# Patient Record
Sex: MALE | Race: WHITE | URBAN - NONMETROPOLITAN AREA
[De-identification: names, ages, dates, MRNs, and addresses within clinical notes are randomized per-mention and may not be internally consistent; named-entity substitution may affect disease eponyms.]

---

## 2018-02-13 ENCOUNTER — OFFICE VISIT (OUTPATIENT)
Dept: FAMILY MEDICINE | Facility: OTHER | Age: 28
End: 2018-02-13
Attending: PHYSICIAN ASSISTANT
Payer: COMMERCIAL

## 2018-02-13 VITALS
WEIGHT: 293 LBS | HEIGHT: 71 IN | TEMPERATURE: 97.3 F | BODY MASS INDEX: 41.02 KG/M2 | DIASTOLIC BLOOD PRESSURE: 78 MMHG | OXYGEN SATURATION: 98 % | SYSTOLIC BLOOD PRESSURE: 122 MMHG | HEART RATE: 82 BPM

## 2018-02-13 DIAGNOSIS — L98.9 SKIN LESION: ICD-10-CM

## 2018-02-13 DIAGNOSIS — Z76.89 ENCOUNTER TO ESTABLISH CARE: Primary | ICD-10-CM

## 2018-02-13 DIAGNOSIS — R03.0 ELEVATED BLOOD-PRESSURE READING WITHOUT DIAGNOSIS OF HYPERTENSION: ICD-10-CM

## 2018-02-13 PROCEDURE — 99203 OFFICE O/P NEW LOW 30 MIN: CPT | Performed by: PHYSICIAN ASSISTANT

## 2018-02-13 PROCEDURE — G0463 HOSPITAL OUTPT CLINIC VISIT: HCPCS

## 2018-02-13 RX ORDER — PENICILLIN V POTASSIUM 250 MG/5ML
500 SOLUTION, RECONSTITUTED, ORAL ORAL 4 TIMES DAILY
COMMUNITY
End: 2018-11-13

## 2018-02-13 ASSESSMENT — ANXIETY QUESTIONNAIRES
1. FEELING NERVOUS, ANXIOUS, OR ON EDGE: NEARLY EVERY DAY
5. BEING SO RESTLESS THAT IT IS HARD TO SIT STILL: NOT AT ALL
4. TROUBLE RELAXING: MORE THAN HALF THE DAYS
GAD7 TOTAL SCORE: 15
2. NOT BEING ABLE TO STOP OR CONTROL WORRYING: NEARLY EVERY DAY
3. WORRYING TOO MUCH ABOUT DIFFERENT THINGS: MORE THAN HALF THE DAYS
6. BECOMING EASILY ANNOYED OR IRRITABLE: NEARLY EVERY DAY
IF YOU CHECKED OFF ANY PROBLEMS ON THIS QUESTIONNAIRE, HOW DIFFICULT HAVE THESE PROBLEMS MADE IT FOR YOU TO DO YOUR WORK, TAKE CARE OF THINGS AT HOME, OR GET ALONG WITH OTHER PEOPLE: SOMEWHAT DIFFICULT
7. FEELING AFRAID AS IF SOMETHING AWFUL MIGHT HAPPEN: MORE THAN HALF THE DAYS

## 2018-02-13 ASSESSMENT — PAIN SCALES - GENERAL: PAINLEVEL: NO PAIN (0)

## 2018-02-13 NOTE — PROGRESS NOTES
Subjective:  Marco Antonio Reyes is a 27 year old male who presents to establish cares. He describes himself as a high functioning autisitic individual. He has history of bipolar disorder and paranoia. He has been on several medications over his lifetime but discontinued all of these 8-9 years ago. He is using lifestyle techniques to control his symptoms.  Concern is a skin lesion on right leg that has been present for years. Itches and bleeds sometimes. He would like removed.  Next he went to the dentist to have a tooth extracted. They check his BP and told him it was too high and he would need to address that before tooth could be removed. No history of heart disease.      Active diagnoses this visit:  Skin lesion    PMH, PSH, FH reviewed and unchanged    Current Outpatient Prescriptions   Medication     penicillin V (VEETID) 250 mg/5 mL suspension     No current facility-administered medications for this visit.        No Known Allergies    Review of Systems:  Gen: recent illness, change in weight  Derm: as above  Resp: denies significant cough, SOB or wheeze  CV: denies chest pain, palpitations   Psych: as above    Objective:    B/P: 122/78, T: 97.3, P: 82, R: Data Unavailable  Body mass index is 40.87 kg/(m^2).    Physical Exam:  Constitutional: healthy, alert and no distress  CV: RRR. No murmur. No peripheral edema  Pulm: Lungs clear to auscultation without wheeze, rales or rhonchi.   Skin: 1.6 raised pink nodule right lower leg.  Psych: Alert, orientated      Assessment/Plan  (Z76.89) Encounter to establish care  (primary encounter diagnosis)  Comment: return prn      (L98.9) Skin lesion  Comment: Refer for excision  Plan: GENERAL SURG ADULT REFERRAL            (R03.0) Elevated blood-pressure reading without diagnosis of hypertension  Comment: BP checked twice today and normal range. Normal exam. Note written for dentist. I suspect patient was anxious about dental appt when they took his BP there              F/u  talon Sutton

## 2018-02-13 NOTE — LETTER
My Depression Action Plan  Name: Marco Antonio Reyes   Date of Birth 1990  Date: 2/13/2018    My doctor: No primary care provider on file.   [address]  [facility] [address]  [phone]          GREEN    ZONE   Good Control    What it looks like:     Things are going generally well. You have normal up s and down s. You may even feel depressed from time to time, but bad moods usually last less than a day.   What you need to do:  1. Continue to care for yourself (see self care plan)  2. Check your depression survival kit and update it as needed  3. Follow your physician s recommendations including any medication.  4. Do not stop taking medication unless you consult with your physician first.           YELLOW         ZONE Getting Worse    What it looks like:     Depression is starting to interfere with your life.     It may be hard to get out of bed; you may be starting to isolate yourself from others.    Symptoms of depression are starting to last most all day and this has happened for several days.     You may have suicidal thoughts but they are not constant.   What you need to do:     1. Call your care team, your response to treatment will improve if you keep your care team informed of your progress. Yellow periods are signs an adjustment may need to be made.     2. Continue your self-care, even if you have to fake it!    3. Talk to someone in your support network    4. Open up your depression survival kit           RED    ZONE Medical Alert - Get Help    What it looks like:     Depression is seriously interfering with your life.     You may experience these or other symptoms: You can t get out of bed most days, can t work or engage in other necessary activities, you have trouble taking care of basic hygiene, or basic responsibilities, thoughts of suicide or death that will not go away, self-injurious behavior.     What you need to do:  1. Call your care team  and request a same-day appointment. If they are not available (weekends or after hours) call your local crisis line, emergency room or 911.      Electronically signed by: Saniya Hernandez, February 13, 2018    Depression Self Care Plan / Survival Kit    Self-Care for Depression  Here s the deal. Your body and mind are really not as separate as most people think.  What you do and think affects how you feel and how you feel influences what you do and think. This means if you do things that people who feel good do, it will help you feel better.  Sometimes this is all it takes.  There is also a place for medication and therapy depending on how severe your depression is, so be sure to consult with your medical provider and/ or Behavioral Health Consultant if your symptoms are worsening or not improving.     In order to better manage my stress, I will:    Exercise  Get some form of exercise, every day. This will help reduce pain and release endorphins, the  feel good  chemicals in your brain. This is almost as good as taking antidepressants!  This is not the same as joining a gym and then never going! (they count on that by the way ) It can be as simple as just going for a walk or doing some gardening, anything that will get you moving.      Hygiene   Maintain good hygiene (Get out of bed in the morning, Make your bed, Brush your teeth, Take a shower, and Get dressed like you were going to work, even if you are unemployed).  If your clothes don't fit try to get ones that do.    Diet  I will strive to eat foods that are good for me, drink plenty of water, and avoid excessive sugar, caffeine, alcohol, and other mood-altering substances.  Some foods that are helpful in depression are: complex carbohydrates, B vitamins, flaxseed, fish or fish oil, fresh fruits and vegetables.    Psychotherapy  I agree to participate in Individual Therapy (if recommended).    Medication  If prescribed medications, I agree to take them.  Missing  doses can result in serious side effects.  I understand that drinking alcohol, or other illicit drug use, may cause potential side effects.  I will not stop my medication abruptly without first discussing it with my provider.    Staying Connected With Others  I will stay in touch with my friends, family members, and my primary care provider/team.    Use your imagination  Be creative.  We all have a creative side; it doesn t matter if it s oil painting, sand castles, or mud pies! This will also kick up the endorphins.    Witness Beauty  (AKA stop and smell the roses) Take a look outside, even in mid-winter. Notice colors, textures. Watch the squirrels and birds.     Service to others  Be of service to others.  There is always someone else in need.  By helping others we can  get out of ourselves  and remember the really important things.  This also provides opportunities for practicing all the other parts of the program.    Humor  Laugh and be silly!  Adjust your TV habits for less news and crime-drama and more comedy.    Control your stress  Try breathing deep, massage therapy, biofeedback, and meditation. Find time to relax each day.     My support system    Clinic Contact:  Phone number:    Contact 1:  Phone number:    Contact 2:  Phone number:    Temple/:  Phone number:    Therapist:  Phone number:    Local crisis center:    Phone number:    Other community support:  Phone number:

## 2018-02-13 NOTE — NURSING NOTE
"Chief Complaint   Patient presents with     Establish Care     Pt has not been seen in over 5 years     Lesion     Back of right lower leg, pt has had it since age 10     RECHECK     Pt states his BP was elevated at dentist 150/90       Initial /88  Pulse 82  Temp 97.3  F (36.3  C)  Ht 5' 11\" (1.803 m)  Wt 293 lb (132.9 kg)  SpO2 98%  BMI 40.87 kg/m2 Estimated body mass index is 40.87 kg/(m^2) as calculated from the following:    Height as of this encounter: 5' 11\" (1.803 m).    Weight as of this encounter: 293 lb (132.9 kg).  Medication Reconciliation: complete   Saniya Hernandez    "

## 2018-02-13 NOTE — MR AVS SNAPSHOT
After Visit Summary   2/13/2018    Marco Antonio Reyes    MRN: [id number]           Patient Information     Date Of Birth          1990        Visit Information        Provider Department      2/13/2018 9:30 AM Mckenna Sutton PA [facility]        Today's Diagnoses     Encounter to establish care    -  1    Skin lesion        Elevated blood-pressure reading without diagnosis of hypertension           Follow-ups after your visit        Additional Services     GENERAL SURG ADULT REFERRAL       Your provider has referred you to:Surgery for 1.6 cm skin lesion.    Please be aware that coverage of these services is subject to the terms and limitations of your health insurance plan.  Call member services at your health plan with any benefit or coverage questions.      Please bring the following with you to your appointment:    (1) Any X-Rays, CTs or MRIs which have been performed.  Contact the facility where they were done to arrange for  prior to your scheduled appointment.   (2) List of current medications   (3) This referral request   (4) Any documents/labs given to you for this referral                  Your next 10 appointments already scheduled     Mar 06, 2018  1:30 PM CST   (Arrive by 1:15 PM)   New Visit with Jamari Payne MD   [facility] ([facility] )    [address] [place] [id number]   [phone]              Who to contact     If you have questions or need follow up information about today's clinic visit or your schedule please contact [facility] directly at [phone].  Normal or non-critical lab and imaging results will be communicated to you by MyChart, letter or phone within 4 business days after the clinic has received the results. If you do not hear from us within 7 days, please contact the clinic through MyChart or phone. If you have a critical or abnormal lab result, we will notify you by phone as  "soon as possible.  Submit refill requests through [employer] or call your pharmacy and they will forward the refill request to us. Please allow 3 business days for your refill to be completed.          Additional Information About Your Visit        Care EveryWhere ID     This is your Care EveryWhere ID. This could be used by other organizations to access your [facility] medical records  [id number]        Your Vitals Were     Pulse Temperature Height Pulse Oximetry BMI (Body Mass Index)       82 97.3  F (36.3  C) 5' 11\" (1.803 m) 98% 40.87 kg/m2        Blood Pressure from Last 3 Encounters:   02/13/18 122/78   01/08/13 118/66   12/13/12 (!) 140/100    Weight from Last 3 Encounters:   02/13/18 293 lb (132.9 kg)   01/08/13 293 lb 3.2 oz (133 kg)   12/13/12 296 lb (134.3 kg)              We Performed the Following     GENERAL SURG ADULT REFERRAL        Primary Care Provider    None Specified       No primary provider on file.        Equal Access to Services     [facility]: Hadii santos ku hadasho Somargieali, waaxda luqadaha, qaybta kaalmada adeegyada, radha rivas . So [facility] [phone].    ATENCIÓN: Si habla español, tiene a mckoy disposición servicios gratuitos de asistencia lingüística. Llame al [phone].    We comply with applicable federal civil rights laws and [place] laws. We do not discriminate on the basis of race, color, national origin, age, disability, sex, sexual orientation, or gender identity.            Thank you!     Thank you for choosing [facility]  for your care. Our goal is always to provide you with excellent care. Hearing back from our patients is one way we can continue to improve our services. Please take a few minutes to complete the written survey that you may receive in the mail after your visit with us. Thank you!             Your Updated Medication List - Protect others around you: Learn how to safely use, store and throw away your medicines at " www.disposemymeds.org.          This list is accurate as of 2/13/18 10:01 AM.  Always use your most recent med list.                   Brand Name Dispense Instructions for use Diagnosis    penicillin V 250 mg/5 mL suspension    VEETID     Take 250 mg by mouth 4 times daily For tooth infection

## 2018-02-14 ASSESSMENT — ANXIETY QUESTIONNAIRES: GAD7 TOTAL SCORE: 15

## 2018-02-14 ASSESSMENT — PATIENT HEALTH QUESTIONNAIRE - PHQ9: SUM OF ALL RESPONSES TO PHQ QUESTIONS 1-9: 12

## 2018-03-06 ENCOUNTER — OFFICE VISIT (OUTPATIENT)
Dept: SURGERY | Facility: OTHER | Age: 28
End: 2018-03-06
Attending: SURGERY
Payer: COMMERCIAL

## 2018-03-06 VITALS
OXYGEN SATURATION: 94 % | SYSTOLIC BLOOD PRESSURE: 138 MMHG | TEMPERATURE: 97.8 F | DIASTOLIC BLOOD PRESSURE: 84 MMHG | HEART RATE: 90 BPM

## 2018-03-06 DIAGNOSIS — L98.9 SKIN LESION: ICD-10-CM

## 2018-03-06 PROCEDURE — 99243 OFF/OP CNSLTJ NEW/EST LOW 30: CPT | Performed by: SURGERY

## 2018-03-06 PROCEDURE — G0463 HOSPITAL OUTPT CLINIC VISIT: HCPCS

## 2018-03-06 ASSESSMENT — PAIN SCALES - GENERAL: PAINLEVEL: NO PAIN (0)

## 2018-03-06 NOTE — PROGRESS NOTES
[facility] Surgery Consultation    CC:  Right lower extremity skin lesion    HPI:  This 27 year old year old male is seen at the request of Mckenna Sutton for evaluation of right lower extremity skin lesion.  The history is obtained from the patient, and reviewing the medical record.  He is good medical historian. He states he has had the skin lesion for many years. He first noticed the lesion on his right lower extremity when he is approximately 11 years old.  He states that it has been relatively stable however there is been times where he would pick at it and start to bleed.  He has not had any pain, drainage, ulceration associated with the lesion.  He states that he has had no other lesions.  He has been referred for surgical excision.    No past medical history on file.    Past Surgical History:   Procedure Laterality Date     OTHER SURGICAL HISTORY      12/31/12,,HERNIA REPAIR,Dr. Bateman     TONSILLECTOMY, ADENOIDECTOMY, COMBINED      No Comments Provided       Pt denied problems with bleeding or anesthesia    Current Outpatient Prescriptions   Medication Sig Dispense Refill     penicillin V (VEETID) 250 mg/5 mL suspension Take 500 mg by mouth 4 times daily For tooth infection           No Known Allergies      HABITS:    Social History   Substance Use Topics     Smoking status: Never Smoker     Smokeless tobacco: Never Used     Alcohol use 0.5 oz/week      Comment: 1-2 times monthly, liquor     No mood altering drug use.    Family History   Problem Relation Age of Onset     Hypertension Mother      Hypertension Father      Prostate Cancer Father      Brain Cancer Maternal Grandfather        REVIEW OF SYSTEMS:  Ten point review of systems negative except those mentioned in the HPI.     The patient denies sleep apnea, latex allergies or MRSA    OBJECTIVE:    /84 (BP Location: Right arm, Patient Position: Sitting, Cuff Size: Adult Large)  Pulse 90  Temp 97.8  F (36.6  C) (Tympanic)  SpO2  94%    GENERAL: Generally appears well, in no distress with appropriate affect.  HEENT:   Sclerae anicteric - No cervical, supra/infraclavicular lymphadenopathy, no thyroid masses  Respiratory:  Lungs clear to ausculation bilaterally with good air excursion  Cardiovascular:  Regular Rate and Rhythm with no murmurs gallops or rubs, normal   Extremities:  Extremities normal. No deformities, edema, or skin discoloration.  Skin:  lesion to the right posterior calf measuring 18 mm x 16 mm in size, the lesion is hard with no surrounding erythema or fluctuance associated with it.  The overlying skin is pink with no ecchymosis.  Neurological: grossly intact    Psych:  Alert, oriented, affect appropriate with normal decision making ability.      IMPRESSION:  Right lower extremity skin lesion    PLAN:  I discussed with patient that as it is symptomatic at this point time with bleeding episode associated with it I recommend excision.  Informed consent was obtained documenting the risks including but not limited to bleeding, infection, damage to surrounding structures.  We will proceed forward with surgical excision at a mutually convenient time.  All questions and concerns were addressed.    Thank you for allowing me to participate in the care of your patient.           Jamari Payne MD    3/6/2018  3:58 PM    cc:  Mckenna Sutton

## 2018-03-06 NOTE — PATIENT INSTRUCTIONS
Thank you for allowing Dr. Payne and our surgical team to participate in your care.  If you have a scheduling or an appointment question please contact our Health Unit Coordinator, Victoria,  at her direct line [phone].   ALL nursing questions or concerns can be directed to your surgical nurse at: [phone] reinaldo     You are scheduled for a: excision of right lower extremity   Your procedure date is: 4/2/18        HOW TO PREPARE-      You need a friend or family member available to drive you home AND stay with you for 24 hours after you leave the hospital. You will not be allowed to drive yourself. IF you need to take a taxi or the bus you MUST have a responsible person to ride with you. YOUR PROCEDURE WILL BE CANCELLED IF YOU DO NOT HAVE A RESPONSIBLE ADULT TO DRIVE YOU HOME.       You need to call our Surgery Education Nurses 1-2 weeks prior to your surgery date at  [phone] or toll free [phone]. Please have you medication and allergy lists ready.      Stop your aspirin or other NSAIDs(Ibuprofen, Motrin, Aleve, Celebrex, Naproxen, etc...) 7 days before your surgery.      Hospital admitting will call you the day before your surgery with your arrival time. If you are scheduled on a Monday admitting will call you the Friday before.      Please call your primary care physician if you should become ill within 24 hours of scheduled surgery. (ex.vomiting, diarrhea, fever)          You will need to wash the night before AND the morning of you procedure with the supplied Hibiclens. Wash your Surgical area with your bare hands, apply friction and rinse. KEEP IT AWAY FROM YOUR EYES, EARS, NOSE AND MOUTH.

## 2018-03-06 NOTE — MR AVS SNAPSHOT
After Visit Summary   3/6/2018    Marco Antonio Reyes    MRN: [id number]           Patient Information     Date Of Birth          1990        Visit Information        Provider Department      3/6/2018 1:30 PM Jamari Payne MD [facility]        Today's Diagnoses     Skin lesion          Care Instructions    Thank you for allowing Dr. Payne and our surgical team to participate in your care.  If you have a scheduling or an appointment question please contact our Health Unit Coordinator, Victoria,  at her direct line [phone].   ALL nursing questions or concerns can be directed to your surgical nurse at: [phone] reinaldo     You are scheduled for a: excision of right lower extremity   Your procedure date is: 4/2/18        HOW TO PREPARE-      You need a friend or family member available to drive you home AND stay with you for 24 hours after you leave the hospital. You will not be allowed to drive yourself. IF you need to take a taxi or the bus you MUST have a responsible person to ride with you. YOUR PROCEDURE WILL BE CANCELLED IF YOU DO NOT HAVE A RESPONSIBLE ADULT TO DRIVE YOU HOME.       You need to call our Surgery Education Nurses 1-2 weeks prior to your surgery date at  [phone] or toll free [phone]. Please have you medication and allergy lists ready.      Stop your aspirin or other NSAIDs(Ibuprofen, Motrin, Aleve, Celebrex, Naproxen, etc...) 7 days before your surgery.      Hospital admitting will call you the day before your surgery with your arrival time. If you are scheduled on a Monday admitting will call you the Friday before.      Please call your primary care physician if you should become ill within 24 hours of scheduled surgery. (ex.vomiting, diarrhea, fever)          You will need to wash the night before AND the morning of you procedure with the supplied Hibiclens. Wash your Surgical area with your bare hands, apply friction and rinse. KEEP IT AWAY  FROM YOUR EYES, EARS, NOSE AND MOUTH.             Follow-ups after your visit        Who to contact     If you have questions or need follow up information about today's clinic visit or your schedule please contact [facility] directly at [phone].  Normal or non-critical lab and imaging results will be communicated to you by MyChart, letter or phone within 4 business days after the clinic has received the results. If you do not hear from us within 7 days, please contact the clinic through MyChart or phone. If you have a critical or abnormal lab result, we will notify you by phone as soon as possible.  Submit refill requests through [employer] or call your pharmacy and they will forward the refill request to us. Please allow 3 business days for your refill to be completed.          Additional Information About Your Visit        Care EveryWhere ID     This is your Care EveryWhere ID. This could be used by other organizations to access your [facility] medical records  [id number]        Your Vitals Were     Pulse Temperature Pulse Oximetry             90 97.8  F (36.6  C) (Tympanic) 94%          Blood Pressure from Last 3 Encounters:   03/06/18 138/84   02/13/18 122/78   01/08/13 118/66    Weight from Last 3 Encounters:   02/13/18 293 lb (132.9 kg)   01/08/13 293 lb 3.2 oz (133 kg)   12/13/12 296 lb (134.3 kg)              Today, you had the following     No orders found for display       Primary Care Provider Fax #    Physician No Ref-Primary [phone]       No address on file        Equal Access to Services     [employer] : Hadii aad ku hadasho Somargieali, waaxda luqadaha, qaybta kaalmada radha bowie . So [facility] [phone].    ATENCIÓN: Si habla español, tiene a mckoy disposición servicios gratuitos de asistencia lingüística. Llame al [phone].    We comply with applicable federal civil rights laws and [place] laws. We do not discriminate on the basis of  race, color, national origin, age, disability, sex, sexual orientation, or gender identity.            Thank you!     Thank you for choosing [facility]  for your care. Our goal is always to provide you with excellent care. Hearing back from our patients is one way we can continue to improve our services. Please take a few minutes to complete the written survey that you may receive in the mail after your visit with us. Thank you!             Your Updated Medication List - Protect others around you: Learn how to safely use, store and throw away your medicines at www.disposemymeds.org.          This list is accurate as of 3/6/18  1:53 PM.  Always use your most recent med list.                   Brand Name Dispense Instructions for use Diagnosis    penicillin V 250 mg/5 mL suspension    VEETID     Take 500 mg by mouth 4 times daily For tooth infection

## 2018-03-06 NOTE — NURSING NOTE
"Chief Complaint   Patient presents with     Consult     skin lesion       Initial /84 (BP Location: Right arm, Patient Position: Sitting, Cuff Size: Adult Large)  Pulse 90  Temp 97.8  F (36.6  C) (Tympanic)  SpO2 94% Estimated body mass index is 40.87 kg/(m^2) as calculated from the following:    Height as of 2/13/18: 5' 11\" (1.803 m).    Weight as of 2/13/18: 293 lb (132.9 kg).  Medication Reconciliation: complete   Rajani Roberts  "

## 2018-03-20 NOTE — H&P (VIEW-ONLY)
[facility] Surgery Consultation    CC:  Right lower extremity skin lesion    HPI:  This 27 year old year old male is seen at the request of Mckenna Sutton for evaluation of right lower extremity skin lesion.  The history is obtained from the patient, and reviewing the medical record.  He is good medical historian. He states he has had the skin lesion for many years. He first noticed the lesion on his right lower extremity when he is approximately 11 years old.  He states that it has been relatively stable however there is been times where he would pick at it and start to bleed.  He has not had any pain, drainage, ulceration associated with the lesion.  He states that he has had no other lesions.  He has been referred for surgical excision.    No past medical history on file.    Past Surgical History:   Procedure Laterality Date     OTHER SURGICAL HISTORY      12/31/12,,HERNIA REPAIR,Dr. Bateman     TONSILLECTOMY, ADENOIDECTOMY, COMBINED      No Comments Provided       Pt denied problems with bleeding or anesthesia    Current Outpatient Prescriptions   Medication Sig Dispense Refill     penicillin V (VEETID) 250 mg/5 mL suspension Take 500 mg by mouth 4 times daily For tooth infection           No Known Allergies      HABITS:    Social History   Substance Use Topics     Smoking status: Never Smoker     Smokeless tobacco: Never Used     Alcohol use 0.5 oz/week      Comment: 1-2 times monthly, liquor     No mood altering drug use.    Family History   Problem Relation Age of Onset     Hypertension Mother      Hypertension Father      Prostate Cancer Father      Brain Cancer Maternal Grandfather        REVIEW OF SYSTEMS:  Ten point review of systems negative except those mentioned in the HPI.     The patient denies sleep apnea, latex allergies or MRSA    OBJECTIVE:    /84 (BP Location: Right arm, Patient Position: Sitting, Cuff Size: Adult Large)  Pulse 90  Temp 97.8  F (36.6  C) (Tympanic)  SpO2  94%    GENERAL: Generally appears well, in no distress with appropriate affect.  HEENT:   Sclerae anicteric - No cervical, supra/infraclavicular lymphadenopathy, no thyroid masses  Respiratory:  Lungs clear to ausculation bilaterally with good air excursion  Cardiovascular:  Regular Rate and Rhythm with no murmurs gallops or rubs, normal   Extremities:  Extremities normal. No deformities, edema, or skin discoloration.  Skin:  lesion to the right posterior calf measuring 18 mm x 16 mm in size, the lesion is hard with no surrounding erythema or fluctuance associated with it.  The overlying skin is pink with no ecchymosis.  Neurological: grossly intact    Psych:  Alert, oriented, affect appropriate with normal decision making ability.      IMPRESSION:  Right lower extremity skin lesion    PLAN:  I discussed with patient that as it is symptomatic at this point time with bleeding episode associated with it I recommend excision.  Informed consent was obtained documenting the risks including but not limited to bleeding, infection, damage to surrounding structures.  We will proceed forward with surgical excision at a mutually convenient time.  All questions and concerns were addressed.    Thank you for allowing me to participate in the care of your patient.           Jamari Payne MD    3/6/2018  3:58 PM    cc:  Mckenna Sutton

## 2018-04-02 ENCOUNTER — HOSPITAL ENCOUNTER (OUTPATIENT)
Facility: HOSPITAL | Age: 28
Discharge: HOME | End: 2018-04-02
Attending: SURGERY | Admitting: SURGERY
Payer: COMMERCIAL

## 2018-04-02 ENCOUNTER — SURGERY (OUTPATIENT)
Age: 28
End: 2018-04-02

## 2018-04-02 ENCOUNTER — ANESTHESIA (OUTPATIENT)
Dept: SURGERY | Facility: HOSPITAL | Age: 28
End: 2018-04-02

## 2018-04-02 VITALS
HEART RATE: 81 BPM | DIASTOLIC BLOOD PRESSURE: 87 MMHG | OXYGEN SATURATION: 95 % | SYSTOLIC BLOOD PRESSURE: 147 MMHG | HEIGHT: 71 IN | TEMPERATURE: 97.6 F | WEIGHT: 293 LBS | BODY MASS INDEX: 41.02 KG/M2 | RESPIRATION RATE: 16 BRPM

## 2018-04-02 DIAGNOSIS — L98.9 SKIN LESION OF RIGHT LEG: Primary | ICD-10-CM

## 2018-04-02 PROCEDURE — 11404 EXC TR-EXT B9+MARG 3.1-4 CM: CPT | Performed by: SURGERY

## 2018-04-02 PROCEDURE — 40000305 ZZH STATISTIC PRE PROC ASSESS I: Performed by: SURGERY

## 2018-04-02 PROCEDURE — 88305 TISSUE EXAM BY PATHOLOGIST: CPT | Mod: TC | Performed by: SURGERY

## 2018-04-02 PROCEDURE — 12032 INTMD RPR S/A/T/EXT 2.6-7.5: CPT | Performed by: SURGERY

## 2018-04-02 PROCEDURE — 27210794 ZZH OR GENERAL SUPPLY STERILE: Performed by: SURGERY

## 2018-04-02 PROCEDURE — 71000027 ZZH RECOVERY PHASE 2 EACH 15 MINS: Performed by: SURGERY

## 2018-04-02 PROCEDURE — 25000125 ZZHC RX 250: Performed by: SURGERY

## 2018-04-02 PROCEDURE — 36000050 ZZH SURGERY LEVEL 2 1ST 30 MIN: Performed by: SURGERY

## 2018-04-02 RX ORDER — OXYCODONE HYDROCHLORIDE 5 MG/1
5 TABLET ORAL EVERY 6 HOURS PRN
Qty: 30 TABLET | Refills: 0 | Status: SHIPPED | OUTPATIENT
Start: 2018-04-02 | End: 2018-11-13

## 2018-04-02 RX ORDER — AMOXICILLIN 250 MG
1-2 CAPSULE ORAL 2 TIMES DAILY
Qty: 30 TABLET | Refills: 0 | Status: SHIPPED | OUTPATIENT
Start: 2018-04-02 | End: 2018-11-13

## 2018-04-02 RX ORDER — BUPIVACAINE HYDROCHLORIDE AND EPINEPHRINE 2.5; 5 MG/ML; UG/ML
INJECTION, SOLUTION INFILTRATION; PERINEURAL PRN
Status: DISCONTINUED | OUTPATIENT
Start: 2018-04-02 | End: 2018-04-02 | Stop reason: HOSPADM

## 2018-04-02 RX ADMIN — BUPIVACAINE HYDROCHLORIDE AND EPINEPHRINE BITARTRATE 21 ML: 2.5; .005 INJECTION, SOLUTION INFILTRATION; PERINEURAL at 14:10

## 2018-04-02 NOTE — IP AVS SNAPSHOT
MRN:[id number]                      After Visit Summary   4/2/2018    Marco Antonio Reyes    MRN: [id number]           Thank you!     Thank you for choosing [facility] for your care. Our goal is always to provide you with excellent care. Hearing back from our patients is one way we can continue to improve our services. Please take a few minutes to complete the written survey that you may receive in the mail after you visit with us. Thank you!        Patient Information     Date Of Birth          1990        About your hospital stay     You were admitted on:  April 2, 2018 You last received care in the:  [facility] Preop/Phase II    You were discharged on:  April 2, 2018       Who to Call     For medical emergencies, please call 911.  For non-urgent questions about your medical care, please call your primary care provider or clinic, None  For questions related to your surgery, please call your surgery clinic        Attending Provider     Provider Jamari Marrero MD Surgery       Primary Care Provider Fax #    Physician No Ref-Primary [phone]      After Care Instructions     Diet Instructions       Resume pre-procedure diet            Discharge Instructions       Follow up appointment as instructed by Surgeon and or RN            Dressing       Keep dressing clean and dry.  Dressing / incisional care as instructed by RN and or Surgeon            Ice to affected area       Ice to operative site PRN            Shower       No shower for 24 hours post procedure. May shower Postoperative Day (POD)  1. Do not immerse incision. You may shower on postoperative day number 1.                  Further instructions from your care team             Post-Anesthesia Patient Instructions    IMMEDIATELY FOLLOWING SURGERY:  Do not drive or operate machinery for the first twenty four hours after surgery.  Do not make any important decisions for twenty four hours after surgery or while taking narcotic pain  medications or sedatives.  If you develop intractable nausea and vomiting or a severe headache please notify your doctor immediately.    FOLLOW-UP:  Please make an appointment with your surgeon as instructed. You do not need to follow up with anesthesia unless specifically instructed to do so.    WOUND CARE INSTRUCTIONS (if applicable):  Keep a dry clean dressing on the anesthesia/puncture wound site if there is drainage.  Once the wound has quit draining you may leave it open to air.  Generally you should leave the bandage intact for twenty four hours unless there is drainage.  If the epidural site drains for more than 36-48 hours please call the anesthesia department.    QUESTIONS?:  Please feel free to call your physician or the hospital  if you have any questions, and they will be happy to assist you.     Incision Care  Remember: Follow-up visits allow your healthcare provider to make sure your incision is healing well. Be sure to keep your appointments.     Stitches (sutures), surgical staples, special strips of surgical tape, or surgical skin glue may be used to close incisions. They also help stop bleeding and speed healing. To help your incision heal, follow the tips on this handout.  Home care  Tips for home care include the following:    Always wash your hands before touching your incision.    Keep your incision clean and dry.    Avoid doing things that could cause dirt or sweat to get on your incision.    Don t pick at scabs. They help protect the wound.    Keep your incision out of water.    Take a sponge bath to avoid getting your incision wet, unless your healthcare provider tells you otherwise.    Ask your provider when can you take a shower or bathe.    Ask your provider about the best way to keep your incision dry when bathing or showering.    Pat stitches dry if they get wet. Don t rub.    Leave the bandage (dressing) in place until you are told to remove it or change it. Change it only as  directed, using clean hands.    After the first 12 hours, change your dressing every 24 hours, or as directed by your healthcare provider.    Change your dressing if it gets wet or soiled.  Care for specific closures  Follow these guidelines unless your healthcare provider tells you otherwise:    Stitches or staples. Once you no longer need to keep these dry, clean the wound daily. First remove the bandage using clean hands. Then wash the area gently with soap and warm water. Use a wet cotton swab to loosen and remove any blood or crust that forms. After cleaning, put a thin layer of antibiotic ointment on. Then put on a new bandage.    Skin glue. Don t put liquid, ointment, or cream on your wound while the glue is in place. Avoid activities that cause heavy sweating. Protect the wound from sunlight. Do not scratch, rub, or pick at the glue. Do not put tape directly over the glue. The glue should peel off within 5 to 10 days.    Surgical tape. Keep the area dry. If it gets wet, blot the area dry with a clean towel. Surgical tape usually falls off within 7 to 10 days. If it has not fallen off after 10 days, contact your healthcare provider before taking it off yourself. If you are told to remove the tape, put mineral oil or petroleum jelly on a cotton ball. Gently rub the tape until it is removed.  Changing your dressing  Leave the dressing (bandage) in place until you are told to remove it or change it. Follow the instructions below unless told otherwise by your healthcare provider:    Always wash your hands before changing your dressing.    After the first 48 hours, the incision wound usually will have closed. At this point, leave the incision uncovered and open to the air. If the incision has not closed keep it covered.    Cover your incision only if your clothing is rubbing it or causing irritation.    Change your dressing if it gets wet or soiled.  Follow-up care  Follow up with your healthcare provider to ask  "how long sutures or staples should be left in place. Be sure to return for stitch or staple removal as directed. If dissolving stitches were used in your mouth, these will not need to be removed. They should fall out or dissolve on their own.  If tape closures were used, remove them yourself when your provider recommends if they have not fallen off on their own. If skin glue was used, the glue will wear off by itself.  When to seek medical care  Call your healthcare provider if you have any of the following:    More pain, redness, swelling, bleeding, or foul-smelling discharge around the incision area    Fever of 100.4 F (38 C) or higher, or as directed by your healthcare provider    Shaking chills    Vomiting or nausea that doesn't go away    Numbness, coldness, or tingling around the incision area, or changes in skin color    Opening of the sutures or wound    Stitches or staples come apart or fall out or surgical tape falls off before 7 days or as directed by your healthcare provider   Date Last Reviewed: 12/1/2016 2000-2017 [employer]. [address] [place] [address]. All rights reserved. This information is not intended as a substitute for professional medical care. Always follow your healthcare professional's instructions.          Pending Results     Date and Time Order Name Status Description    4/2/2018 1408 Surgical pathology exam In process             Admission Information     Date & Time Provider Department Dept. Phone    4/2/2018 Jamari Payne MD [facility] Preop/Phase [phone]      Your Vitals Were     Blood Pressure Pulse Temperature Respirations Height Weight    133/81 81 96.9  F (36.1  C) (Oral) 16 1.803 m (5' 11\") 132.9 kg (293 lb)    Pulse Oximetry BMI (Body Mass Index)                95% 40.87 kg/m2          Care EveryWhere ID     This is your Care EveryWhere ID. This could be used by other organizations to access your [facility] medical records  [id number]      "   Equal Access to Services     [facility]: Hadii aad ku hadeviealyse [place], waaxda luqadaha, qaybta kaalmaradha monsalve. So [facility] [phone].    ATENCIÓN: Si habla kanchanañol, tiene a mckoy disposición servicios gratuitos de asistencia lingüística. Marcianoame al [phone].    We comply with applicable federal civil rights laws and [place] laws. We do not discriminate on the basis of race, color, national origin, age, disability, sex, sexual orientation, or gender identity.               Review of your medicines      START taking        Dose / Directions    oxyCODONE IR 5 MG tablet   Commonly known as:  ROXICODONE   Used for:  Skin lesion of right leg        Dose:  5 mg   Take 1 tablet (5 mg) by mouth every 6 hours as needed for pain or other (Moderate to Severe)   Quantity:  30 tablet   Refills:  0       senna-docusate 8.6-50 MG per tablet   Commonly known as:  SENOKOT-S;PERICOLACE   Used for:  Skin lesion of right leg        Dose:  1-2 tablet   Take 1-2 tablets by mouth 2 times daily Take while on oral narcotics to prevent or treat constipation.   Quantity:  30 tablet   Refills:  0         CONTINUE these medicines which have NOT CHANGED        Dose / Directions    penicillin V 250 mg/5 mL suspension   Commonly known as:  VEETID        Dose:  500 mg   Take 500 mg by mouth 4 times daily For tooth infection   Refills:  0            Where to get your medicines      These medications were sent to [id number] - [place], [place] - [address] [address]  [address] [address], [place] [address]     Phone:  [phone]     senna-docusate 8.6-50 MG per tablet         Some of these will need a paper prescription and others can be bought over the counter. Ask your nurse if you have questions.     Bring a paper prescription for each of these medications     oxyCODONE IR 5 MG tablet                Protect others around you: Learn how to safely use, store and throw away your medicines at  www.disposemymeds.org.        Information about OPIOIDS     PRESCRIPTION OPIOIDS: WHAT YOU NEED TO KNOW    Prescription opioids can be used to help relieve moderate to severe pain and are often prescribed following a surgery or injury, or for certain health conditions. These medications can be an important part of treatment but also come with serious risks. It is important to work with your health care provider to make sure you are getting the safest, most effective care.    WHAT ARE THE RISKS AND SIDE EFFECTS OF OPIOID USE?  Prescription opioids carry serious risks of addiction and overdose, especially with prolonged use. An opioid overdose, often marked by slowed breathing can cause sudden death. The use of prescription opioids can have a number of side effects as well, even when taken as directed:      Tolerance - meaning you might need to take more of a medication for the same pain relief    Physical dependence - meaning you have symptoms of withdrawal when a medication is stopped    Increased sensitivity to pain    Constipation    Nausea, vomiting, and dry mouth    Sleepiness and dizziness    Confusion    Depression    Low levels of testosterone that can result in lower sex drive, energy, and strength    Itching and sweating    RISKS ARE GREATER WITH:    History of drug misuse, substance use disorder, or overdose    Mental health conditions (such as depression or anxiety)    Sleep apnea    Older age (65 years or older)    Pregnancy    Avoid alcohol while taking prescription opioids.   Also, unless specifically advised by your health care provider, medications to avoid include:    Benzodiazepines (such as Xanax or Valium)    Muscle relaxants (such as Soma or Flexeril)    Hypnotics (such as Ambien or Lunesta)    Other prescription opioids    KNOW YOUR OPTIONS:  Talk to your health care provider about ways to manage your pain that do not involve prescription opioids. Some of these options may actually work better  and have fewer risks and side effects:    Pain relievers such as acetaminophen, ibuprofen, and naproxen    Some medications that are also used for depression or seizures    Physical therapy and exercise    Cognitive behavioral therapy, a psychological, goal-directed approach, in which patients learn how to modify physical, behavioral, and emotional triggers of pain and stress    IF YOU ARE PRESCRIBED OPIOIDS FOR PAIN:    Never take opioids in greater amounts or more often than prescribed    Follow up with your primary health care provider and work together to create a plan on how to manage your pain.    Talk about ways to help manage your pain that do not involve prescription opioids    Talk about all concerns and side effects    Help prevent misuse and abuse    Never sell or share prescription opioids    Never use another person's prescription opioids    Store prescription opioids in a secure place and out of reach of others (this may include visitors, children, friends, and family)    Visit www.cdc.gov/drugoverdose to learn about risks of opioid abuse and overdose    If you believe you may be struggling with addiction, tell your health care provider and ask for guidance or call [facility]'s National Helpline at 6-853-898-HELP    LEARN MORE / www.cdc.gov/drugoverdose/prescribing/guideline.html    Safely dispose of unused prescription opioids: Find your local drug take-back programs and more information about the importance of safe disposal at www.doseofreality.[place].gov             Medication List: This is a list of all your medications and when to take them. Check marks below indicate your daily home schedule. Keep this list as a reference.      Medications           Morning Afternoon Evening Bedtime As Needed    oxyCODONE IR 5 MG tablet   Commonly known as:  ROXICODONE   Take 1 tablet (5 mg) by mouth every 6 hours as needed for pain or other (Moderate to Severe)                                penicillin V 250 mg/5 mL  suspension   Commonly known as:  VEETID   Take 500 mg by mouth 4 times daily For tooth infection                                senna-docusate 8.6-50 MG per tablet   Commonly known as:  SENOKOT-S;PERICOLACE   Take 1-2 tablets by mouth 2 times daily Take while on oral narcotics to prevent or treat constipation.

## 2018-04-02 NOTE — OP NOTE
[facility]   Operative Note    Pre-operative diagnosis: SKIN LESION RIGHT LOWER EXTREMITY   Post-operative diagnosis Skin lesion to right lower extremity   Procedure: Procedure(s):  EXCISION OF RIGHT LOWER EXTREMITY SKIN LESION - Wound Class: II-Clean Contaminated   Surgeon(s): Surgeon(s) and Role:     * Jamari Payne MD - Primary   Estimated blood loss: * No values recorded between 4/2/2018  1:56 PM and 4/2/2018  2:16 PM *    Specimens:   ID Type Source Tests Collected by Time Destination   A : skin lesion Tissue Leg Lower, Right SURGICAL PATHOLOGY EXAM Jamari Payne MD 4/2/2018  2:07 PM       Findings: Four by two centimeter elliptical incision created to excise the skin lesion. There was no deep attachments.     Description of procedure:   The right lower extremity was prepped and draped sterilely.  The timeout pause was observed.  Local anesthesia was obtained with infiltration of 0.25% plain Marcaine.  An ellipse was fashioned to include grossly clear margins with the incision taken through full thickness skin to the subcutaneous tissue measuring 40 x 20 mm.  Full thickness skin was elevated off the subcutaneous fat sharply and the lesion was excised in toto.   The wound was irrigated with sterile saline.  Layered closure was accomplished with interrupted 2-0 Vicryl in the subcutaneous tissue; the skin was reapproximated with running 4-0 Monocryl.      The wound was cleansed and Dermabond was then applied. A sterile dressing was applied.  The patient was transferred to the PACU in stable condition. A post operative debrief was performed at the conclusion of the case.     Jamari Payne  4/2/2018

## 2018-04-02 NOTE — DISCHARGE INSTRUCTIONS
Post-Anesthesia Patient Instructions    IMMEDIATELY FOLLOWING SURGERY:  Do not drive or operate machinery for the first twenty four hours after surgery.  Do not make any important decisions for twenty four hours after surgery or while taking narcotic pain medications or sedatives.  If you develop intractable nausea and vomiting or a severe headache please notify your doctor immediately.    FOLLOW-UP:  Please make an appointment with your surgeon as instructed. You do not need to follow up with anesthesia unless specifically instructed to do so.    WOUND CARE INSTRUCTIONS (if applicable):  Keep a dry clean dressing on the anesthesia/puncture wound site if there is drainage.  Once the wound has quit draining you may leave it open to air.  Generally you should leave the bandage intact for twenty four hours unless there is drainage.  If the epidural site drains for more than 36-48 hours please call the anesthesia department.    QUESTIONS?:  Please feel free to call your physician or the hospital  if you have any questions, and they will be happy to assist you.     Incision Care  Remember: Follow-up visits allow your healthcare provider to make sure your incision is healing well. Be sure to keep your appointments.     Stitches (sutures), surgical staples, special strips of surgical tape, or surgical skin glue may be used to close incisions. They also help stop bleeding and speed healing. To help your incision heal, follow the tips on this handout.  Home care  Tips for home care include the following:    Always wash your hands before touching your incision.    Keep your incision clean and dry.    Avoid doing things that could cause dirt or sweat to get on your incision.    Don t pick at scabs. They help protect the wound.    Keep your incision out of water.    Take a sponge bath to avoid getting your incision wet, unless your healthcare provider tells you otherwise.    Ask your provider when can you take a  shower or bathe.    Ask your provider about the best way to keep your incision dry when bathing or showering.    Pat stitches dry if they get wet. Don t rub.    Leave the bandage (dressing) in place until you are told to remove it or change it. Change it only as directed, using clean hands.    After the first 12 hours, change your dressing every 24 hours, or as directed by your healthcare provider.    Change your dressing if it gets wet or soiled.  Care for specific closures  Follow these guidelines unless your healthcare provider tells you otherwise:    Stitches or staples. Once you no longer need to keep these dry, clean the wound daily. First remove the bandage using clean hands. Then wash the area gently with soap and warm water. Use a wet cotton swab to loosen and remove any blood or crust that forms. After cleaning, put a thin layer of antibiotic ointment on. Then put on a new bandage.    Skin glue. Don t put liquid, ointment, or cream on your wound while the glue is in place. Avoid activities that cause heavy sweating. Protect the wound from sunlight. Do not scratch, rub, or pick at the glue. Do not put tape directly over the glue. The glue should peel off within 5 to 10 days.    Surgical tape. Keep the area dry. If it gets wet, blot the area dry with a clean towel. Surgical tape usually falls off within 7 to 10 days. If it has not fallen off after 10 days, contact your healthcare provider before taking it off yourself. If you are told to remove the tape, put mineral oil or petroleum jelly on a cotton ball. Gently rub the tape until it is removed.  Changing your dressing  Leave the dressing (bandage) in place until you are told to remove it or change it. Follow the instructions below unless told otherwise by your healthcare provider:    Always wash your hands before changing your dressing.    After the first 48 hours, the incision wound usually will have closed. At this point, leave the incision uncovered and  open to the air. If the incision has not closed keep it covered.    Cover your incision only if your clothing is rubbing it or causing irritation.    Change your dressing if it gets wet or soiled.  Follow-up care  Follow up with your healthcare provider to ask how long sutures or staples should be left in place. Be sure to return for stitch or staple removal as directed. If dissolving stitches were used in your mouth, these will not need to be removed. They should fall out or dissolve on their own.  If tape closures were used, remove them yourself when your provider recommends if they have not fallen off on their own. If skin glue was used, the glue will wear off by itself.  When to seek medical care  Call your healthcare provider if you have any of the following:    More pain, redness, swelling, bleeding, or foul-smelling discharge around the incision area    Fever of 100.4 F (38 C) or higher, or as directed by your healthcare provider    Shaking chills    Vomiting or nausea that doesn't go away    Numbness, coldness, or tingling around the incision area, or changes in skin color    Opening of the sutures or wound    Stitches or staples come apart or fall out or surgical tape falls off before 7 days or as directed by your healthcare provider   Date Last Reviewed: 12/1/2016 2000-2017 [employer]. [address] [place] [address], [place] [address]. All rights reserved. This information is not intended as a substitute for professional medical care. Always follow your healthcare professional's instructions.

## 2018-04-02 NOTE — OR NURSING
Patient and responsible adult given discharge instructions with no questions regarding instructions. Faye score 20. Pain level 0/10.  Discharged from unit via ambulation. Patient discharged to home.

## 2018-04-02 NOTE — BRIEF OP NOTE
[facility] Brief Operative Note    Pre-operative diagnosis: SKIN LESION RIGHT LOWER EXTREMITY   Post-operative diagnosis * No post-op diagnosis entered *   Procedure: Procedure(s):  EXCISION OF RIGHT LOWER EXTREMITY SKIN LESION - Wound Class: II-Clean Contaminated   Surgeon: Jamari Payne MD   Assistants(s): none   Estimated blood loss: Minimal    Specimens: Right lower extremity skin lesion   Findings: Four by two centimeter elliptical incision created to excise the skin lesion. There was no deep attachments.

## 2018-04-02 NOTE — IP AVS SNAPSHOT
[facility] II    [address] [address]    Phone:  [phone]                                       After Visit Summary   4/2/2018    Marco Antonio Reyse    MRN: [id number]           After Visit Summary Signature Page     I have received my discharge instructions, and my questions have been answered. I have discussed any challenges I see with this plan with the nurse or doctor.    ..........................................................................................................................................  Patient/Patient Representative Signature      ..........................................................................................................................................  Patient Representative Print Name and Relationship to Patient    ..................................................               ................................................  Date                                            Time    ..........................................................................................................................................  Reviewed by Signature/Title    ...................................................              ..............................................  Date                                                            Time

## 2018-04-03 LAB — COPATH REPORT: NORMAL

## 2018-05-16 ENCOUNTER — ANESTHESIA EVENT (OUTPATIENT)
Dept: SURGERY | Facility: HOSPITAL | Age: 28
End: 2018-05-16

## 2018-11-12 NOTE — PROGRESS NOTES
SUBJECTIVE:   Marco Antonio Reyes is a 28 year old male who presents to clinic today for the following health issues: Urinary frequency, low back pain, scrotal pain worsening in past 2 weeks. Chills. Denies risk for STD. No blood in urine.      Scrotum pain with back pain      Duration: about 4 months, roughly around july    Description (location/character/radiation): scrotum and left side pain    Intensity:  mild, moderate    Accompanying signs and symptoms: having some side pain and low back pain which radiates down into the left scrotum most often, but does go into the right scrotum. Feels the low back and upper rib pain are all tied together with the scrotum pain. No pain when urinating.     History (similar episodes/previous evaluation): None    Precipitating or alleviating factors: None    Therapies tried and outcome: None           Problem list and histories reviewed & adjusted, as indicated.  Additional history: as documented    Patient Active Problem List   Diagnosis     Asperger syndrome     Bipolar I disorder (H)     Cannabis abuse     Depression with anxiety     Paranoia (H)     Tourette disorder     Wart viral     Past Surgical History:   Procedure Laterality Date     EXCISE LESION LOWER EXTREMITY Right 4/2/2018    Procedure: EXCISE LESION LOWER EXTREMITY;  EXCISION OF RIGHT LOWER EXTREMITY SKIN LESION;  Surgeon: Jamari Payne MD;  Location: [facility] OR     OTHER SURGICAL HISTORY      12/31/12,,HERNIA REPAIR,Dr. Bateman     TONSILLECTOMY, ADENOIDECTOMY, COMBINED      No Comments Provided       Social History   Substance Use Topics     Smoking status: Never Smoker     Smokeless tobacco: Never Used     Alcohol use 0.5 oz/week      Comment: 1-2 times monthly, liquor     Family History   Problem Relation Age of Onset     Hypertension Mother      Hypertension Father      Prostate Cancer Father      Brain Cancer Maternal Grandfather          Current Outpatient Prescriptions   Medication Sig Dispense  Refill     ciprofloxacin (CIPRO) 500 MG tablet Take 1 tablet (500 mg) by mouth 2 times daily 20 tablet 0     No Known Allergies    Reviewed and updated as needed this visit by clinical staff       Reviewed and updated as needed this visit by Provider         ROS:  Constitutional, HEENT, cardiovascular, pulmonary, gi and gu systems are negative, except as otherwise noted.    OBJECTIVE:                                                    /78  Pulse 67  Temp 97.2  F (36.2  C) (Tympanic)  Wt 302 lb (137 kg)  SpO2 98%  BMI 42.12 kg/m2  Body mass index is 42.12 kg/(m^2).          Physical Exam:  Constitutional: healthy, alert and no acute distress  Skin: No suspicious rash or skin lesion  CV: RRR. No murmur  Pulm: Lungs clear to auscultation without wheeze, rales or rhonchi  GI: Abdomen soft, mild diffuse TTP. CVA TTP bilateral. BS normal. No masses, organomegaly. Exam of penis and testicles normal. NTTP at exam.  Psych: mentation and affect appear normal                   ASSESSMENT/PLAN:                                                      (N12) Pyelonephritis  (primary encounter diagnosis)  Plan: ciprofloxacin (CIPRO) 500 MG tablet            (N50.819) Testicular pain, unspecified  Plan: CBC with platelets and differential, *UA reflex        to Microscopic and Culture - [facility],         GC/Chlamydia by PCR - [facility]                Rest, increase fluids, Tylenol for fever or discomfort. Return to clinic if symptoms persist or worsen.      JED Huitron  [facility]

## 2018-11-13 ENCOUNTER — OFFICE VISIT (OUTPATIENT)
Dept: FAMILY MEDICINE | Facility: OTHER | Age: 28
End: 2018-11-13
Attending: PHYSICIAN ASSISTANT
Payer: COMMERCIAL

## 2018-11-13 VITALS
BODY MASS INDEX: 42.12 KG/M2 | OXYGEN SATURATION: 98 % | TEMPERATURE: 97.2 F | DIASTOLIC BLOOD PRESSURE: 78 MMHG | SYSTOLIC BLOOD PRESSURE: 136 MMHG | HEART RATE: 67 BPM | WEIGHT: 302 LBS

## 2018-11-13 DIAGNOSIS — N50.819 TESTICULAR PAIN, UNSPECIFIED: ICD-10-CM

## 2018-11-13 DIAGNOSIS — N12 PYELONEPHRITIS: Primary | ICD-10-CM

## 2018-11-13 LAB
ALBUMIN UR-MCNC: NEGATIVE MG/DL
APPEARANCE UR: CLEAR
BILIRUB UR QL STRIP: NEGATIVE
COLOR UR AUTO: YELLOW
DIFFERENTIAL METHOD BLD: ABNORMAL
EOSINOPHIL # BLD AUTO: 1.4 10E9/L (ref 0–0.7)
EOSINOPHIL NFR BLD AUTO: 9 %
ERYTHROCYTE [DISTWIDTH] IN BLOOD BY AUTOMATED COUNT: 13 % (ref 10–15)
GLUCOSE UR STRIP-MCNC: NEGATIVE MG/DL
HCT VFR BLD AUTO: 47.8 % (ref 40–53)
HGB BLD-MCNC: 16.1 G/DL (ref 13.3–17.7)
HGB UR QL STRIP: NEGATIVE
KETONES UR STRIP-MCNC: NEGATIVE MG/DL
LEUKOCYTE ESTERASE UR QL STRIP: NEGATIVE
LYMPHOCYTES # BLD AUTO: 7.1 10E9/L (ref 0.8–5.3)
LYMPHOCYTES NFR BLD AUTO: 46 %
MCH RBC QN AUTO: 31.9 PG (ref 26.5–33)
MCHC RBC AUTO-ENTMCNC: 33.7 G/DL (ref 31.5–36.5)
MCV RBC AUTO: 95 FL (ref 78–100)
MONOCYTES # BLD AUTO: 0.6 10E9/L (ref 0–1.3)
MONOCYTES NFR BLD AUTO: 4 %
NEUTROPHILS # BLD AUTO: 6.4 10E9/L (ref 1.6–8.3)
NEUTROPHILS NFR BLD AUTO: 41 %
NITRATE UR QL: NEGATIVE
PH UR STRIP: 6 PH (ref 5–7)
PLATELET # BLD AUTO: 309 10E9/L (ref 150–450)
PLATELET # BLD EST: ABNORMAL 10*3/UL
RBC # BLD AUTO: 5.05 10E12/L (ref 4.4–5.9)
SOURCE: NORMAL
SP GR UR STRIP: >1.03 (ref 1–1.03)
UROBILINOGEN UR STRIP-ACNC: 0.2 EU/DL (ref 0.2–1)
VARIANT LYMPHS BLD QL SMEAR: PRESENT
WBC # BLD AUTO: 15.5 10E9/L (ref 4–11)

## 2018-11-13 PROCEDURE — 36415 COLL VENOUS BLD VENIPUNCTURE: CPT | Mod: ZL | Performed by: PHYSICIAN ASSISTANT

## 2018-11-13 PROCEDURE — 81003 URINALYSIS AUTO W/O SCOPE: CPT | Mod: ZL | Performed by: PHYSICIAN ASSISTANT

## 2018-11-13 PROCEDURE — 87491 CHLMYD TRACH DNA AMP PROBE: CPT | Mod: ZL | Performed by: PHYSICIAN ASSISTANT

## 2018-11-13 PROCEDURE — 85025 COMPLETE CBC W/AUTO DIFF WBC: CPT | Mod: ZL | Performed by: PHYSICIAN ASSISTANT

## 2018-11-13 PROCEDURE — 87591 N.GONORRHOEAE DNA AMP PROB: CPT | Mod: ZL | Performed by: PHYSICIAN ASSISTANT

## 2018-11-13 PROCEDURE — G0463 HOSPITAL OUTPT CLINIC VISIT: HCPCS

## 2018-11-13 PROCEDURE — 99213 OFFICE O/P EST LOW 20 MIN: CPT | Performed by: PHYSICIAN ASSISTANT

## 2018-11-13 RX ORDER — CIPROFLOXACIN 500 MG/1
500 TABLET, FILM COATED ORAL 2 TIMES DAILY
Qty: 20 TABLET | Refills: 0 | Status: SHIPPED | OUTPATIENT
Start: 2018-11-13 | End: 2018-11-30

## 2018-11-13 ASSESSMENT — ANXIETY QUESTIONNAIRES
5. BEING SO RESTLESS THAT IT IS HARD TO SIT STILL: NOT AT ALL
2. NOT BEING ABLE TO STOP OR CONTROL WORRYING: MORE THAN HALF THE DAYS
7. FEELING AFRAID AS IF SOMETHING AWFUL MIGHT HAPPEN: NEARLY EVERY DAY
4. TROUBLE RELAXING: MORE THAN HALF THE DAYS
6. BECOMING EASILY ANNOYED OR IRRITABLE: NEARLY EVERY DAY
GAD7 TOTAL SCORE: 14
1. FEELING NERVOUS, ANXIOUS, OR ON EDGE: MORE THAN HALF THE DAYS
3. WORRYING TOO MUCH ABOUT DIFFERENT THINGS: MORE THAN HALF THE DAYS

## 2018-11-13 ASSESSMENT — PATIENT HEALTH QUESTIONNAIRE - PHQ9: SUM OF ALL RESPONSES TO PHQ QUESTIONS 1-9: 13

## 2018-11-13 ASSESSMENT — PAIN SCALES - GENERAL: PAINLEVEL: NO PAIN (0)

## 2018-11-13 NOTE — MR AVS SNAPSHOT
After Visit Summary   11/13/2018    Marco Antonio Reyes    MRN: [id number]           Patient Information     Date Of Birth          1990        Visit Information        Provider Department      11/13/2018 8:30 AM Mckenna Sutton PA [facility]        Today's Diagnoses     Pyelonephritis    -  1    Testicular pain, unspecified           Follow-ups after your visit        Who to contact     If you have questions or need follow up information about today's clinic visit or your schedule please contact [facility] directly at [phone].  Normal or non-critical lab and imaging results will be communicated to you by MyChart, letter or phone within 4 business days after the clinic has received the results. If you do not hear from us within 7 days, please contact the clinic through MyChart or phone. If you have a critical or abnormal lab result, we will notify you by phone as soon as possible.  Submit refill requests through [employer] or call your pharmacy and they will forward the refill request to us. Please allow 3 business days for your refill to be completed.          Additional Information About Your Visit        Care EveryWhere ID     This is your Care EveryWhere ID. This could be used by other organizations to access your [place] medical records  [id number]        Your Vitals Were     Pulse Temperature Pulse Oximetry BMI (Body Mass Index)          67 97.2  F (36.2  C) (Tympanic) 98% 42.12 kg/m2         Blood Pressure from Last 3 Encounters:   11/13/18 136/78   04/02/18 147/87   03/06/18 138/84    Weight from Last 3 Encounters:   11/13/18 302 lb (137 kg)   04/02/18 293 lb (132.9 kg)   02/13/18 293 lb (132.9 kg)              We Performed the Following     *UA reflex to Microscopic and Culture - [facility] [place]     CBC with platelets and differential     GC/Chlamydia by PCR - [facility]          Today's Medication Changes          These changes are  accurate as of 11/13/18 12:05 PM.  If you have any questions, ask your nurse or doctor.               Start taking these medicines.        Dose/Directions    ciprofloxacin 500 MG tablet   Commonly known as:  CIPRO   Used for:  Pyelonephritis   Started by:  Mckenna Sutton PA        Dose:  500 mg   Take 1 tablet (500 mg) by mouth 2 times daily   Quantity:  20 tablet   Refills:  0            Where to get your medicines      These medications were sent to [id number] - [place], [place] - [address]   [id number] , [place] [address]     Phone:  [phone]     ciprofloxacin 500 MG tablet                Primary Care Provider Fax #    Physician No Ref-Primary [phone]       No address on file        Equal Access to Services     [employer] : Vinayak Smith, wacabrera dasilva, qaybta kaalmada azeb, radha rivas . So [place] [phone].    ATENCIÓN: Si habla español, tiene a mckoy disposición servicios gratuitos de asistencia lingüística. Llame al [phone].    We comply with applicable federal civil rights laws and [place] laws. We do not discriminate on the basis of race, color, national origin, age, disability, sex, sexual orientation, or gender identity.            Thank you!     Thank you for choosing [facility]  for your care. Our goal is always to provide you with excellent care. Hearing back from our patients is one way we can continue to improve our services. Please take a few minutes to complete the written survey that you may receive in the mail after your visit with us. Thank you!             Your Updated Medication List - Protect others around you: Learn how to safely use, store and throw away your medicines at www.disposemymeds.org.          This list is accurate as of 11/13/18 12:05 PM.  Always use your most recent med list.                   Brand Name Dispense Instructions for use Diagnosis    ciprofloxacin 500 MG tablet     CIPRO    20 tablet    Take 1 tablet (500 mg) by mouth 2 times daily    Pyelonephritis

## 2018-11-13 NOTE — NURSING NOTE
"Chief Complaint   Patient presents with     Back Pain     Groin Pain       Initial /78  Pulse 67  Temp 97.2  F (36.2  C) (Tympanic)  Wt 302 lb (137 kg)  SpO2 98%  BMI 42.12 kg/m2 Estimated body mass index is 42.12 kg/(m^2) as calculated from the following:    Height as of 4/2/18: 5' 11\" (1.803 m).    Weight as of this encounter: 302 lb (137 kg).  Medication Reconciliation: complete    Veronica Lopez MA    "

## 2018-11-14 LAB
C TRACH DNA SPEC QL PROBE+SIG AMP: NOT DETECTED
N GONORRHOEA DNA SPEC QL PROBE+SIG AMP: NOT DETECTED
SPECIMEN SOURCE: NORMAL

## 2018-11-14 ASSESSMENT — ANXIETY QUESTIONNAIRES: GAD7 TOTAL SCORE: 14

## 2018-11-29 NOTE — PROGRESS NOTES
SUBJECTIVE:   Marco Antonio Reyes is a 28 year old male who presents to clinic today for the following health issues: LUQ abdominal pain that is intermittent. Some heartburn. No nausea or vomiting        Pyelonephritis Follow up      Duration: follow up    Description (location/character/radiation): upper left abdomen and left testicle area    Intensity:  mild    Accompanying signs and symptoms: pain is starting to come back after the medication is gone. Frequent urination is also coming back    History (similar episodes/previous evaluation): None    Precipitating or alleviating factors: None    Therapies tried and outcome: round of antibiotics           Problem list and histories reviewed & adjusted, as indicated.  Additional history: as documented    Patient Active Problem List   Diagnosis     Asperger syndrome     Bipolar I disorder (H)     Cannabis abuse     Depression with anxiety     Paranoia (H)     Tourette disorder     Wart viral     Past Surgical History:   Procedure Laterality Date     EXCISE LESION LOWER EXTREMITY Right 4/2/2018    Procedure: EXCISE LESION LOWER EXTREMITY;  EXCISION OF RIGHT LOWER EXTREMITY SKIN LESION;  Surgeon: Jamari Payne MD;  Location: [facility] OR     OTHER SURGICAL HISTORY      12/31/12,,HERNIA REPAIR,Dr. Bateman     TONSILLECTOMY, ADENOIDECTOMY, COMBINED      No Comments Provided       Social History   Substance Use Topics     Smoking status: Never Smoker     Smokeless tobacco: Never Used     Alcohol use 0.5 oz/week      Comment: 1-2 times monthly, liquor     Family History   Problem Relation Age of Onset     Hypertension Mother      Hypertension Father      Prostate Cancer Father      Brain Cancer Maternal Grandfather          Current Outpatient Prescriptions   Medication Sig Dispense Refill     omeprazole (PRILOSEC) 20 MG DR capsule Take 1 capsule (20 mg) by mouth daily 30 capsule 1     No Known Allergies    Reviewed and updated as needed this visit by clinical staff        Reviewed and updated as needed this visit by Provider         ROS:  Constitutional, HEENT, cardiovascular, pulmonary, gi and gu systems are negative, except as otherwise noted.    OBJECTIVE:                                                    /72  Pulse 100  Temp 96.8  F (36  C) (Tympanic)  Wt 298 lb (135.2 kg)  SpO2 94%  BMI 41.56 kg/m2  Body mass index is 41.56 kg/(m^2).          Physical Exam:  Constitutional: healthy, alert and no acute distress  Skin: No suspicious rash or skin lesion  ENT: Posterior pharynx moist and pink without edema or exudate.  EAC's clear. TM's intact bilateral.  CV: RRR. No murmur  Pulm: Lungs clear to auscultation without wheeze, rales or rhonchi  GI: Abdomen soft, non-tender. BS normal. No masses, organomegaly  Psych: mentation and affect appear normal                   ASSESSMENT/PLAN:                                                    1. Gastroesophageal reflux disease, esophagitis presence not specified  - omeprazole (PRILOSEC) 20 MG DR capsule; Take 1 capsule (20 mg) by mouth daily  Dispense: 30 capsule; Refill: 1      1 month f/u    JED Huitron  [facility]

## 2018-11-30 ENCOUNTER — OFFICE VISIT (OUTPATIENT)
Dept: FAMILY MEDICINE | Facility: OTHER | Age: 28
End: 2018-11-30
Attending: PHYSICIAN ASSISTANT
Payer: COMMERCIAL

## 2018-11-30 VITALS
SYSTOLIC BLOOD PRESSURE: 138 MMHG | HEART RATE: 100 BPM | OXYGEN SATURATION: 94 % | TEMPERATURE: 96.8 F | WEIGHT: 298 LBS | BODY MASS INDEX: 41.56 KG/M2 | DIASTOLIC BLOOD PRESSURE: 72 MMHG

## 2018-11-30 DIAGNOSIS — K21.9 GASTROESOPHAGEAL REFLUX DISEASE, ESOPHAGITIS PRESENCE NOT SPECIFIED: Primary | ICD-10-CM

## 2018-11-30 PROCEDURE — G0463 HOSPITAL OUTPT CLINIC VISIT: HCPCS

## 2018-11-30 PROCEDURE — 99213 OFFICE O/P EST LOW 20 MIN: CPT | Performed by: PHYSICIAN ASSISTANT

## 2018-11-30 ASSESSMENT — PAIN SCALES - GENERAL: PAINLEVEL: MILD PAIN (3)

## 2018-11-30 NOTE — MR AVS SNAPSHOT
After Visit Summary   11/30/2018    Marco Antonio Reyes    MRN: [id number]           Patient Information     Date Of Birth          1990        Visit Information        Provider Department      11/30/2018 8:45 AM Mckenna Sutton PA [facility]        Today's Diagnoses     Gastroesophageal reflux disease, esophagitis presence not specified    -  1       Follow-ups after your visit        Who to contact     If you have questions or need follow up information about today's clinic visit or your schedule please contact [facility] directly at [phone].  Normal or non-critical lab and imaging results will be communicated to you by MyChart, letter or phone within 4 business days after the clinic has received the results. If you do not hear from us within 7 days, please contact the clinic through MyChart or phone. If you have a critical or abnormal lab result, we will notify you by phone as soon as possible.  Submit refill requests through [employer] or call your pharmacy and they will forward the refill request to us. Please allow 3 business days for your refill to be completed.          Additional Information About Your Visit        Care EveryWhere ID     This is your Care EveryWhere ID. This could be used by other organizations to access your [place] medical records  [id number]        Your Vitals Were     Pulse Temperature Pulse Oximetry BMI (Body Mass Index)          100 96.8  F (36  C) (Tympanic) 94% 41.56 kg/m2         Blood Pressure from Last 3 Encounters:   11/30/18 138/72   11/13/18 136/78   04/02/18 147/87    Weight from Last 3 Encounters:   11/30/18 298 lb (135.2 kg)   11/13/18 302 lb (137 kg)   04/02/18 293 lb (132.9 kg)              Today, you had the following     No orders found for display         Today's Medication Changes          These changes are accurate as of 11/30/18  8:47 AM.  If you have any questions, ask your nurse or doctor.                Start taking these medicines.        Dose/Directions    omeprazole 20 MG DR capsule   Commonly known as:  priLOSEC   Used for:  Gastroesophageal reflux disease, esophagitis presence not specified   Started by:  Mckenna Sutton PA        Dose:  20 mg   Take 1 capsule (20 mg) by mouth daily   Quantity:  30 capsule   Refills:  1            Where to get your medicines      These medications were sent to [id number] - [place], [place] - [address]   [id number] , [place] [address]     Phone:  [phone]     omeprazole 20 MG DR capsule                Primary Care Provider Fax #    Physician No Ref-Primary [phone]       No address on file        Equal Access to Services     [facility]: Hadii santos rene hadasho [place], waaxda luqadaha, qaybta kaalmada adeegyada, radha rivas . So [facility] [phone].    ATENCIÓN: Si habla español, tiene a mckoy disposición servicios gratuitos de asistencia lingüística. [facility] [phone].    We comply with applicable federal civil rights laws and [place] laws. We do not discriminate on the basis of race, color, national origin, age, disability, sex, sexual orientation, or gender identity.            Thank you!     Thank you for choosing [facility]  for your care. Our goal is always to provide you with excellent care. Hearing back from our patients is one way we can continue to improve our services. Please take a few minutes to complete the written survey that you may receive in the mail after your visit with us. Thank you!             Your Updated Medication List - Protect others around you: Learn how to safely use, store and throw away your medicines at www.disposemymeds.org.          This list is accurate as of 11/30/18  8:47 AM.  Always use your most recent med list.                   Brand Name Dispense Instructions for use Diagnosis    omeprazole 20 MG DR capsule    priLOSEC    30 capsule    Take 1  capsule (20 mg) by mouth daily    Gastroesophageal reflux disease, esophagitis presence not specified

## 2018-11-30 NOTE — NURSING NOTE
"Chief Complaint   Patient presents with     Pain     pyelonephritis       Initial /72  Pulse 100  Temp 96.8  F (36  C) (Tympanic)  Wt 298 lb (135.2 kg)  SpO2 94%  BMI 41.56 kg/m2 Estimated body mass index is 41.56 kg/(m^2) as calculated from the following:    Height as of 4/2/18: 5' 11\" (1.803 m).    Weight as of this encounter: 298 lb (135.2 kg).  Medication Reconciliation: complete    Veronica Lopez MA    "

## 2019-01-24 NOTE — PROGRESS NOTES
SUBJECTIVE:                                                    Marco Antonio Reyes is a 28 year old male who presents to clinic today for the following health issues: Patient is feeling improved but intermittent symptoms about 2 times weekly.He completed 1 month of Omeprazole 20 mg daily.      GERD/Heartburn      Duration: since November     Description (location/character/radiation): right side of chest and rib area    Intensity:  moderate    Accompanying signs and symptoms:  food getting stuck: no   nausea/vomiting/blood: YES- nausea in the mornings  abdominal pain: YES- upper right quad  black/tarry or bloody stools: YES- some slight blood but thinks it was from pushing to hard:    History (similar episodes/previous evaluation): ongoing    Precipitating or alleviating factors:  worse with spicy foods, caffeinated drinks and alcohol.  current NSAID/Aspirin use: no, only tylenol    Therapies tried and outcome: Omeprazole (Prilosec)          Problem list and histories reviewed & adjusted, as indicated.  Additional history: as documented    Patient Active Problem List   Diagnosis     Asperger syndrome     Bipolar I disorder (H)     Cannabis abuse     Depression with anxiety     Paranoia (H)     Tourette disorder     Wart viral     Past Surgical History:   Procedure Laterality Date     EXCISE LESION LOWER EXTREMITY Right 4/2/2018    Procedure: EXCISE LESION LOWER EXTREMITY;  EXCISION OF RIGHT LOWER EXTREMITY SKIN LESION;  Surgeon: Jamari Payne MD;  Location: [facility]     OTHER SURGICAL HISTORY      12/31/12,,HERNIA REPAIR,Dr. Bateman     TONSILLECTOMY, ADENOIDECTOMY, COMBINED      No Comments Provided       Social History     Tobacco Use     Smoking status: Never Smoker     Smokeless tobacco: Never Used   Substance Use Topics     Alcohol use: Yes     Alcohol/week: 0.5 oz     Comment: 1-2 times monthly, liquor     Family History   Problem Relation Age of Onset     Hypertension Mother      Hypertension Father       Prostate Cancer Father      Brain Cancer Maternal Grandfather          Current Outpatient Medications   Medication Sig Dispense Refill     ranitidine (ZANTAC) 150 MG tablet Take 1 tablet (150 mg) by mouth 2 times daily 60 tablet 0     No Known Allergies    ROS:  Constitutional, HEENT, cardiovascular, pulmonary, gi and gu systems are negative, except as otherwise noted.    OBJECTIVE:                                                    /82   Pulse 83   Temp 96.8  F (36  C) (Tympanic)   Wt 130.6 kg (288 lb)   SpO2 95%   BMI 40.17 kg/m    Body mass index is 40.17 kg/m .          Physical Exam:  Constitutional: healthy, alert and no acute distress  Skin: No suspicious rash or skin lesion  CV: RRR. No murmur  Pulm: Lungs clear to auscultation without wheeze, rales or rhonchi  GI: Abdomen soft, non-tender. BS normal. No masses, organomegaly  Psych: mentation and affect appear normal                   ASSESSMENT/PLAN:                                                    1. Gastroesophageal reflux disease with esophagitis  Take bid prn for 1 month.  - ranitidine (ZANTAC) 150 MG tablet; Take 1 tablet (150 mg) by mouth 2 times daily  Dispense: 60 tablet; Refill: 0     Return to clinic if symptoms persist or worsen.    JED Huitron  [facility]

## 2019-01-28 ENCOUNTER — OFFICE VISIT (OUTPATIENT)
Dept: FAMILY MEDICINE | Facility: OTHER | Age: 29
End: 2019-01-28
Attending: PHYSICIAN ASSISTANT
Payer: COMMERCIAL

## 2019-01-28 VITALS
TEMPERATURE: 96.8 F | SYSTOLIC BLOOD PRESSURE: 126 MMHG | WEIGHT: 288 LBS | OXYGEN SATURATION: 95 % | BODY MASS INDEX: 40.17 KG/M2 | HEART RATE: 83 BPM | DIASTOLIC BLOOD PRESSURE: 82 MMHG

## 2019-01-28 DIAGNOSIS — K21.00 GASTROESOPHAGEAL REFLUX DISEASE WITH ESOPHAGITIS: Primary | ICD-10-CM

## 2019-01-28 PROCEDURE — G0463 HOSPITAL OUTPT CLINIC VISIT: HCPCS

## 2019-01-28 PROCEDURE — 99213 OFFICE O/P EST LOW 20 MIN: CPT | Performed by: PHYSICIAN ASSISTANT

## 2019-01-28 ASSESSMENT — ANXIETY QUESTIONNAIRES
IF YOU CHECKED OFF ANY PROBLEMS ON THIS QUESTIONNAIRE, HOW DIFFICULT HAVE THESE PROBLEMS MADE IT FOR YOU TO DO YOUR WORK, TAKE CARE OF THINGS AT HOME, OR GET ALONG WITH OTHER PEOPLE: EXTREMELY DIFFICULT
4. TROUBLE RELAXING: NEARLY EVERY DAY
1. FEELING NERVOUS, ANXIOUS, OR ON EDGE: NEARLY EVERY DAY
7. FEELING AFRAID AS IF SOMETHING AWFUL MIGHT HAPPEN: NEARLY EVERY DAY
2. NOT BEING ABLE TO STOP OR CONTROL WORRYING: NEARLY EVERY DAY
5. BEING SO RESTLESS THAT IT IS HARD TO SIT STILL: SEVERAL DAYS
GAD7 TOTAL SCORE: 19
6. BECOMING EASILY ANNOYED OR IRRITABLE: NEARLY EVERY DAY
3. WORRYING TOO MUCH ABOUT DIFFERENT THINGS: NEARLY EVERY DAY

## 2019-01-28 ASSESSMENT — PATIENT HEALTH QUESTIONNAIRE - PHQ9: SUM OF ALL RESPONSES TO PHQ QUESTIONS 1-9: 16

## 2019-01-28 ASSESSMENT — PAIN SCALES - GENERAL: PAINLEVEL: NO PAIN (0)

## 2019-01-28 NOTE — NURSING NOTE
"Chief Complaint   Patient presents with     Gastrophageal Reflux       Initial BP (!) 126/94   Pulse 83   Temp 96.8  F (36  C) (Tympanic)   Wt 130.6 kg (288 lb)   SpO2 95%   BMI 40.17 kg/m   Estimated body mass index is 40.17 kg/m  as calculated from the following:    Height as of 4/2/18: 1.803 m (5' 11\").    Weight as of this encounter: 130.6 kg (288 lb).  Medication Reconciliation: complete    Veronica Lopez MA    "

## 2019-01-29 ASSESSMENT — ANXIETY QUESTIONNAIRES: GAD7 TOTAL SCORE: 19

## (undated) DEVICE — CAUTERY-MEGADYNE TIP

## (undated) DEVICE — TUBING-SUCTION 20FT

## (undated) DEVICE — GLV-7.5 PROTEXIS PI CLASSIC LF/PF

## (undated) DEVICE — GOWN-SURG XL LVL 3 REINFORCED

## (undated) DEVICE — NDL-25G 1-1/2" NON-SAFETY

## (undated) DEVICE — CAUTERY PAD-POLYHESIVE II ADULT

## (undated) DEVICE — LIGHT HANDLE COVER

## (undated) DEVICE — NDL-BLUNT FILL 18G 1.5"

## (undated) DEVICE — IRRIGATION-NACL 1000ML

## (undated) DEVICE — TRAY-SKIN PREP POVIDONE/IODINE

## (undated) DEVICE — GOWN-SURG XXL LVL 3 REINFORCED

## (undated) DEVICE — GLV-8.0 PROTEXIS PI BLUE W/NEU-THERA LF/PF

## (undated) DEVICE — DRSG-TEGADERM MEDIUM #1626

## (undated) DEVICE — APPLICATOR-CHLORAPREP 26ML TINTED CHG 2%+ 70% IPA-SURGICAL

## (undated) DEVICE — BDG-ELASTIC 3 INCH

## (undated) DEVICE — CAUTERY PENCIL

## (undated) DEVICE — DRSG-TEGADERM LARGE 6"X8"

## (undated) DEVICE — SUTURE-VICRYL 2-0 SH J417H

## (undated) DEVICE — LABEL-STERILE PREPRINTED FOR OR

## (undated) DEVICE — IRRIGATION-H2O 1000ML

## (undated) DEVICE — PACK-SET UP-CUSTOM

## (undated) DEVICE — STERI-STRIP-1/2" X 4"

## (undated) DEVICE — SUTURE-MONOCRYL 4-0 PS-2 Y496G

## (undated) DEVICE — BLADE-SCALPEL #15

## (undated) DEVICE — CANISTER-SUCTION 2000CC

## (undated) DEVICE — DRSG-SPONGE STERILE 4 X 4